# Patient Record
Sex: MALE | Race: WHITE | NOT HISPANIC OR LATINO | URBAN - METROPOLITAN AREA
[De-identification: names, ages, dates, MRNs, and addresses within clinical notes are randomized per-mention and may not be internally consistent; named-entity substitution may affect disease eponyms.]

---

## 2020-11-18 NOTE — PATIENT DISCUSSION
Sinusitis, Adult  Sinusitis is soreness and inflammation of your sinuses. Sinuses are hollow spaces in the bones around your face. Your sinuses are located:  · Around your eyes.  · In the middle of your forehead.  · Behind your nose.  · In your cheekbones.  Your sinuses and nasal passages are lined with a stringy fluid (mucus). Mucus normally drains out of your sinuses. When your nasal tissues become inflamed or swollen, the mucus can become trapped or blocked so air cannot flow through your sinuses. This allows bacteria, viruses, and funguses to grow, which leads to infection.  Sinusitis can develop quickly and last for 7-10 days (acute) or for more than 12 weeks (chronic). Sinusitis often develops after a cold.  CAUSES  This condition is caused by anything that creates swelling in the sinuses or stops mucus from draining, including:  · Allergies.  · Asthma.  · Bacterial or viral infection.  · Abnormally shaped bones between the nasal passages.  · Nasal growths that contain mucus (nasal polyps).  · Narrow sinus openings.  · Pollutants, such as chemicals or irritants in the air.  · A foreign object stuck in the nose.  · A fungal infection. This is rare.  RISK FACTORS  The following factors may make you more likely to develop this condition:  · Having allergies or asthma.  · Having had a recent cold or respiratory tract infection.  · Having structural deformities or blockages in your nose or sinuses.  · Having a weak immune system.  · Doing a lot of swimming or diving.  · Overusing nasal sprays.  · Smoking.  SYMPTOMS  The main symptoms of this condition are pain and a feeling of pressure around the affected sinuses. Other symptoms include:  · Upper toothache.  · Earache.  · Headache.  · Bad breath.  · Decreased sense of smell and taste.  · A cough that may get worse at night.  · Fatigue.  · Fever.  · Thick drainage from your nose. The drainage is often green and it may contain pus (purulent).  · Stuffy nose or  Surgery Counseling: I have discussed the option of scheduling surgery versus following, as well as the risks, benefits and alternatives of cataract surgery with the patient. It was explained that the surgery is medically indicated at this time, and it can be performed at the patient's option as delaying will cause no further deterioration, therefore there is no rush and there is no harm in waiting to have surgery. It was also explained that there is no guarantee that removing the cataract will improve their vision. The patient understands and desires to proceed with cataract surgery with the implantation of an intraocular lens to improve vision for ___WATCHING TV____. I have given the patient the prescribed regimen of the all-in-one drop to use before and after cataract surgery. They have elected to use the all-in-one option of Pred/Gati/Brom(prednisolone acetate,gatifloxacin,and bromfenac. Patient to administer as directed. congestion.  · Postnasal drip. This is when extra mucus collects in the throat or back of the nose.  · Swelling and warmth over the affected sinuses.  · Sore throat.  · Sensitivity to light.  DIAGNOSIS  This condition is diagnosed based on symptoms, a medical history, and a physical exam. To find out if your condition is acute or chronic, your health care provider may:  · Look in your nose for signs of nasal polyps.  · Tap over the affected sinus to check for signs of infection.  · View the inside of your sinuses using an imaging device that has a light attached (endoscope).  If your health care provider suspects that you have chronic sinusitis, you may also:  · Be tested for allergies.  · Have a sample of mucus taken from your nose (nasal culture) and checked for bacteria.  · Have a mucus sample examined to see if your sinusitis is related to an allergy.  If your sinusitis does not respond to treatment and it lasts longer than 8 weeks, you may have an MRI or CT scan to check your sinuses. These scans also help to determine how severe your infection is.  In rare cases, a bone biopsy may be done to rule out more serious types of fungal sinus disease.  TREATMENT  Treatment for sinusitis depends on the cause and whether your condition is chronic or acute. If a virus is causing your sinusitis, your symptoms will go away on their own within 10 days. You may be given medicines to relieve your symptoms, including:  · Topical nasal decongestants. They shrink swollen nasal passages and let mucus drain from your sinuses.  · Antihistamines. These drugs block inflammation that is triggered by allergies. This can help to ease swelling in your nose and sinuses.  · Topical nasal corticosteroids. These are nasal sprays that ease inflammation and swelling in your nose and sinuses.  · Nasal saline washes. These rinses can help to get rid of thick mucus in your nose.  If your condition is caused by bacteria, you will be given an  antibiotic medicine. If your condition is caused by a fungus, you will be given an antifungal medicine.  Surgery may be needed to correct underlying conditions, such as narrow nasal passages. Surgery may also be needed to remove polyps.  HOME CARE INSTRUCTIONS  Medicines  · Take, use, or apply over-the-counter and prescription medicines only as told by your health care provider. These may include nasal sprays.  · If you were prescribed an antibiotic medicine, take it as told by your health care provider. Do not stop taking the antibiotic even if you start to feel better.  Hydrate and Humidify  · Drink enough water to keep your urine clear or pale yellow. Staying hydrated will help to thin your mucus.  · Use a cool mist humidifier to keep the humidity level in your home above 50%.  · Inhale steam for 10-15 minutes, 3-4 times a day or as told by your health care provider. You can do this in the bathroom while a hot shower is running.  · Limit your exposure to cool or dry air.  Rest  · Rest as much as possible.  · Sleep with your head raised (elevated).  · Make sure to get enough sleep each night.  General Instructions  · Apply a warm, moist washcloth to your face 3-4 times a day or as told by your health care provider. This will help with discomfort.  · Wash your hands often with soap and water to reduce your exposure to viruses and other germs. If soap and water are not available, use hand .  · Do not smoke. Avoid being around people who are smoking (secondhand smoke).  · Keep all follow-up visits as told by your health care provider. This is important.  SEEK MEDICAL CARE IF:  · You have a fever.  · Your symptoms get worse.  · Your symptoms do not improve within 10 days.  SEEK IMMEDIATE MEDICAL CARE IF:  · You have a severe headache.  · You have persistent vomiting.  · You have pain or swelling around your face or eyes.  · You have vision problems.  · You develop confusion.  · Your neck is stiff.  · You have  trouble breathing.     This information is not intended to replace advice given to you by your health care provider. Make sure you discuss any questions you have with your health care provider.     Document Released: 12/18/2006 Document Revised: 04/10/2017 Document Reviewed: 10/12/2016  Elsevier Interactive Patient Education ©2017 [employer]

## 2020-11-18 NOTE — PATIENT DISCUSSION
CATARACTS, OU - VISUALLY SIGNIFICANT. SCHEDULE _OS_ FIRST THEN LATER IN _OD_ DISCUSSED OPTION OF ___STANDARD OU___. PATIENT UNDERSTANDS AND DESIRES ____STANDARD OU____.

## 2021-01-04 NOTE — PATIENT DISCUSSION
S/P PE IOL, _OS_. DOING WELL. CONTINUE PRED-GATI-BROM IN THE SURGICAL EYE  FOR A TOTAL OF 3 WEEKS USE THEN DISCONTINUE. PATIENT DESIRES _STANDARD__IOL FOR 2ND EYE. SCHEDULE CATARACT SURGERY.

## 2021-01-04 NOTE — PATIENT DISCUSSION
Pre-Op 2nd Eye Counseling: The patient has noticed an improvement in their visual symptoms in the operative eye. The patient complains of decreased vision in the fellow eye when _DRIVING / READING_. It was explained to the patient that the decision to proceed with cataract surgery in the fellow eye is entirely a separate decision from the surgical eye. All of the same risks, benefits and alternatives are reviewed with the patient again. The patient does feel the vision in the non-operative eye is limiting their daily activities and elects to proceed with cataract surgery in the _RIGHT_ eye. . I have given the patient the prescribed regimen of  drops to use before and after cataract surgery. Patient to administer as directed.

## 2021-01-20 NOTE — PATIENT DISCUSSION
S/P PE IOL, OU, DOING WELL. CONTINUE DROPS AS DIRECTED. SPECS RX OFFERED. RX ARC IN SPECS TO MINIMIZE GLARE. RETURN FOR FOLLOW-UP AS SCHEDULED.

## 2021-02-12 NOTE — PATIENT DISCUSSION
Continue: erythromycin (erythromycin): ointment: 5 mg/gram (0.5 %) a small amount at bedtime into affected eye 02-

## 2021-05-12 NOTE — PATIENT DISCUSSION
New Prescription: FreshKote (polyvinyl alcohol-povidone): drops: 2.7-2% 1 drop four times a day into both eyes 05-

## 2021-08-05 NOTE — PATIENT DISCUSSION
The patient was informed that with [facility] for distance, they will need glasses for all near and intermediate activities after surgery. The patient understands there is a possibility they may need an enhancement after surgery. The patient elects [employer] OD, goal of emmetropia.

## 2021-08-10 NOTE — PATIENT DISCUSSION
The patient was informed that with [address] [facility] for distance, they will need glasses for all near and intermediate activities after surgery. The patient understands there is a possibility they may need an enhancement after surgery. The patient elects [employer] OD, goal of emmetropia.

## 2021-08-10 NOTE — PATIENT DISCUSSION
The patient was informed that with [address] [facility] for distance, they will need glasses for all near and intermediate activities after surgery. The patient understands there is a possibility they may need an enhancement after surgery. The patient elects [employer] OD, goal of emmetropia.

## 2021-08-11 NOTE — PATIENT DISCUSSION
Cataract surgery has been performed in the first eye and activities of daily living are still impaired. The patient would like to proceed with cataract surgery in the second eye as scheduled. The patient elects [employer] OS, goal of Mera.

## 2021-09-10 NOTE — PATIENT DISCUSSION
1 day PO: Patient is doing well post-operatively. The importance of post-op drop compliance was emphasized. Drop schedule reviewed with patient. Patient to call if any visual changes or concerns.
Continue drops as directed.
Darrell Ayala 72 OCT is normal ou.
Despite some risk factors, the patient does not demonstrate definitive evidence of glaucoma at this time.
Discussed condition and exacerbating conditions/situations (e.g., dry/arid environments, overhead fans, air conditioners, side effect of medications).
Discussed lid hygiene, warm compress and eyelid wash.
Discussed the importance of blood sugar control in the prevention of ocular complications.
LensAR.
Monitor.
No retinal holes, tears, or detachment at this time.
[facility]
Patient made aware of 24/7 emergency services.
Patient understands condition, prognosis and need for follow up care.
Patient understands there is an increased risk of corneal edema after cataract surgery.
RD/tear warning symptoms reviewed. F&F brochure given. Call immediately if increase in floaters, flashes, veil, blur.
Reassured patient.
Recommended artificial tears to use: 1 drop 4x a day in both eyes.
Recommended observation.
Recommended yearly dilated eye examinations.
Stable.
10

## 2022-05-31 ENCOUNTER — NEW PATIENT (OUTPATIENT)
Dept: URBAN - METROPOLITAN AREA CLINIC 6 | Facility: CLINIC | Age: 57
End: 2022-05-31

## 2022-05-31 DIAGNOSIS — H25.13: ICD-10-CM

## 2022-05-31 PROCEDURE — 92004 COMPRE OPH EXAM NEW PT 1/>: CPT

## 2022-05-31 ASSESSMENT — VISUAL ACUITY
OU_CC: J1
OU_SC: J5
OD_SC: 20/20-3
OS_SC: 20/20-3

## 2022-05-31 ASSESSMENT — TONOMETRY
OD_IOP_MMHG: 13
OS_IOP_MMHG: 14

## 2022-05-31 NOTE — PATIENT DISCUSSION
Discussed option of glasses vs monovision LASIK. Patient would prefer LASIK due to his occupation as a .

## 2022-05-31 NOTE — PATIENT DISCUSSION
Interested in [facility] surgery ([facility]), will send referral to Dr Cristóbal Hammer.

## 2022-07-11 ENCOUNTER — CONSULTATION/EVALUATION (OUTPATIENT)
Dept: URBAN - METROPOLITAN AREA CLINIC 14 | Facility: CLINIC | Age: 57
End: 2022-07-11

## 2022-07-11 DIAGNOSIS — H52.4: ICD-10-CM

## 2022-07-11 DIAGNOSIS — H25.13: ICD-10-CM

## 2022-07-11 PROCEDURE — 99499LK REFRACTIVE CONSULT/LASIK

## 2022-07-11 ASSESSMENT — KERATOMETRY
OD_AXISANGLE2_DEGREES: 86
OD_AXISANGLE_DEGREES: 176
OS_AXISANGLE_DEGREES: 0
OS_K1POWER_DIOPTERS: 43.75
OD_K2POWER_DIOPTERS: 44.00
OD_K1POWER_DIOPTERS: 43.50
OS_K2POWER_DIOPTERS: 43.75
OS_AXISANGLE2_DEGREES: 90

## 2022-07-11 ASSESSMENT — VISUAL ACUITY
OS_SC: J6
OS_SC: 20/20
OU_SC: 20/20
OU_SC: J6
OD_SC: J7
OD_SC: 20/20

## 2022-07-11 ASSESSMENT — TONOMETRY
OD_IOP_MMHG: 15
OS_IOP_MMHG: 19

## 2022-07-11 ASSESSMENT — PACHYMETRY: OS_CT_UM: 546

## 2022-07-11 NOTE — PATIENT DISCUSSION
Patient understands that vision after refractive procedure may be short lived due to cataracts. He understands and desires to proceed with monovision contact lens trial prior to refractive procedure.

## 2022-07-11 NOTE — PATIENT DISCUSSION
Discussed findings, not visually significant at this time. Advised patient that as cataracts progress vision will continue to deteriorate even after refractive surgery.

## 2022-07-11 NOTE — PATIENT DISCUSSION
Refractive Counseling: Good candidate for LASIK. I have discussed the options for refractive surgery to decrease dependency on glasses and/or contact lenses. These options include LASIK, PRK / ASA, ICL, RLE. It was emphasized to the patient that the goal of reducing spectacle dependence not spectacle freedom is more realistic. The patient understands it is possible they will still need a weak spectacle prescription and/or a LASIK enhancement to achieve visual target.

## 2022-08-18 ENCOUNTER — CLINIC PROCEDURE ONLY (OUTPATIENT)
Dept: URBAN - METROPOLITAN AREA CLINIC 14 | Facility: CLINIC | Age: 57
End: 2022-08-18

## 2022-08-18 DIAGNOSIS — H52.7: ICD-10-CM

## 2022-08-18 PROBLEM — Z98.890: Noted: 2022-08-18

## 2022-08-18 PROCEDURE — 99211NC NO CHARGE VISIT

## 2022-08-18 RX ORDER — GATIFLOXACIN 5 MG/ML: 1 SOLUTION/ DROPS OPHTHALMIC

## 2022-08-18 RX ORDER — PREDNISOLONE ACETATE 10 MG/ML: 1 SUSPENSION/ DROPS OPHTHALMIC

## 2022-08-18 ASSESSMENT — VISUAL ACUITY: OS_CC: J1+

## 2022-08-18 NOTE — PROCEDURE NOTE: CLINICAL
PROCEDURE NOTE: LASIK OS. Anesthesia: Oral Sedative. Prep: Antibiotic Drops. Betadine. After discussing risks, benefits and alternatives, patient has elected to proceed with the procedure and an informed consent was obtained. Prior to commencing surgery, patient identification, surgical procedure, site, and side with a time out were confirmed by the physician. Several drops of topical anesthetic and one drop of povidone iodine were instilled, and the eye was flushed with balance salt solution. A non-aspirating lid speculum was placed, and the patient was positioned under the femtosecond laser. A sterile patient interface was placed on the eye. After pressure was confirmed, and suction was achieved, the eye and flap procedure were centered and laser was applied, and LASIK flap created. The suction was discontinued, the patient interface was discarded, and the lid speculum removed. The patient was then positioned under the excimer laser. Prior to the treatment, patient identification, surgical procedure, site, and side with a time out were confirmed by the physician. A drop of topical anesthetic instilled. The patient was draped using a clean technique to isolate and cover the lashes and lid margins. An aspirating lid speculum was placed. A drop of balance salt solution was instilled. The edge of the LASIK flap was located and lifted with the Patricia I IntraLase flap . The corneal bed dried with a Merocel eye sponge, the pupil was centered, and laser treatment was applied. An additional drop of balance salt solution applied to corneal bed; flap was repositioned/floated into position. Excess Balance Salt Solution was removed with a Pretty ring. The corneal flap was smoothed with wet and dry Merocel. A drop of topical antibiotic and steroid was instilled consecutively. The lid speculum removed. The patient was escorted to recovery area in stable condition and without complication. Post procedure instructions given. Myke Morataya

## 2022-08-19 ENCOUNTER — POST-OP (OUTPATIENT)
Dept: URBAN - METROPOLITAN AREA CLINIC 14 | Facility: CLINIC | Age: 57
End: 2022-08-19

## 2022-08-19 DIAGNOSIS — H52.4: ICD-10-CM

## 2022-08-19 PROCEDURE — 99024LK POST OP LASIK CARE ONLY

## 2022-08-19 ASSESSMENT — KERATOMETRY
OD_AXISANGLE_DEGREES: 176
OS_K2POWER_DIOPTERS: 43.75
OD_AXISANGLE2_DEGREES: 86
OS_AXISANGLE_DEGREES: 0
OD_K2POWER_DIOPTERS: 44.00
OS_K1POWER_DIOPTERS: 43.75
OD_K1POWER_DIOPTERS: 43.50
OS_AXISANGLE2_DEGREES: 90

## 2022-08-19 ASSESSMENT — VISUAL ACUITY: OS_SC: J1+

## 2022-08-30 ENCOUNTER — POST-OP (OUTPATIENT)
Dept: URBAN - METROPOLITAN AREA CLINIC 14 | Facility: CLINIC | Age: 57
End: 2022-08-30

## 2022-08-30 DIAGNOSIS — Z98.890: ICD-10-CM

## 2022-08-30 PROCEDURE — 99024LK POST OP LASIK CARE ONLY

## 2022-08-30 ASSESSMENT — VISUAL ACUITY
OS_SC: 20/400
OU_SC: 20/25
OS_SC: J1+
OD_SC: 20/25
OU_SC: J1+

## 2022-08-30 ASSESSMENT — KERATOMETRY
OS_K1POWER_DIOPTERS: 43.75
OD_AXISANGLE_DEGREES: 176
OD_K2POWER_DIOPTERS: 44.00
OD_K1POWER_DIOPTERS: 43.50
OS_AXISANGLE_DEGREES: 0
OS_AXISANGLE2_DEGREES: 90
OS_K2POWER_DIOPTERS: 43.75
OD_AXISANGLE2_DEGREES: 86

## 2022-09-27 ENCOUNTER — POST-OP (OUTPATIENT)
Dept: URBAN - METROPOLITAN AREA CLINIC 14 | Facility: CLINIC | Age: 57
End: 2022-09-27

## 2022-09-27 DIAGNOSIS — Z98.890: ICD-10-CM

## 2022-09-27 PROCEDURE — 99024LK POST OP LASIK CARE ONLY

## 2022-09-27 ASSESSMENT — KERATOMETRY
OS_K1POWER_DIOPTERS: 43.75
OS_AXISANGLE2_DEGREES: 90
OD_AXISANGLE_DEGREES: 176
OD_K2POWER_DIOPTERS: 44.00
OS_AXISANGLE_DEGREES: 0
OD_K1POWER_DIOPTERS: 43.50
OS_K2POWER_DIOPTERS: 43.75
OD_AXISANGLE2_DEGREES: 86

## 2022-09-27 ASSESSMENT — VISUAL ACUITY
OD_SC: 20/25
OS_BCVA: 20/20
OD_BCVA: 20/20
OS_SC: J1+
OS_SC: 20/200

## 2022-11-21 ENCOUNTER — POST-OP (OUTPATIENT)
Dept: URBAN - METROPOLITAN AREA CLINIC 14 | Facility: CLINIC | Age: 57
End: 2022-11-21

## 2022-11-21 DIAGNOSIS — Z98.890: ICD-10-CM

## 2022-11-21 PROCEDURE — 99024LK POST OP LASIK CARE ONLY

## 2022-11-21 ASSESSMENT — KERATOMETRY
OS_K2POWER_DIOPTERS: 43.75
OS_AXISANGLE2_DEGREES: 90
OD_AXISANGLE2_DEGREES: 86
OS_AXISANGLE_DEGREES: 0
OD_K1POWER_DIOPTERS: 43.50
OD_AXISANGLE_DEGREES: 176
OS_K1POWER_DIOPTERS: 43.75
OD_K2POWER_DIOPTERS: 44.00

## 2022-11-21 ASSESSMENT — VISUAL ACUITY
OD_SC: 20/25
OS_SC: J1+
OS_BCVA: 20/20
OU_SC: 20/15
OS_SC: 20/200

## 2023-08-04 ENCOUNTER — ESTABLISHED PATIENT (OUTPATIENT)
Dept: URBAN - METROPOLITAN AREA CLINIC 14 | Facility: CLINIC | Age: 58
End: 2023-08-04

## 2023-08-04 DIAGNOSIS — H25.13: ICD-10-CM

## 2023-08-04 PROCEDURE — 92014 COMPRE OPH EXAM EST PT 1/>: CPT

## 2023-08-04 ASSESSMENT — KERATOMETRY
OS_K2POWER_DIOPTERS: 45.00
OS_AXISANGLE2_DEGREES: 90
OD_AXISANGLE_DEGREES: 170
OS_AXISANGLE_DEGREES: 0
OS_K1POWER_DIOPTERS: 45.00
OD_K2POWER_DIOPTERS: 44.00
OD_AXISANGLE2_DEGREES: 80
OD_K1POWER_DIOPTERS: 43.50

## 2023-08-04 ASSESSMENT — VISUAL ACUITY
OS_SC: 20/25
OS_SC: J3
OD_SC: 20/15

## 2023-08-04 ASSESSMENT — TONOMETRY
OS_IOP_MMHG: 14
OD_IOP_MMHG: 13

## 2023-10-24 NOTE — PATIENT DISCUSSION
COUNSELING: Recommendations (Free Text): Referral to [facility] provided today Detail Level: Zone Recommendation Preamble: The following recommendations were made during the visit: Render Risk Assessment In Note?: no

## 2025-01-07 ENCOUNTER — COMPREHENSIVE EXAM (OUTPATIENT)
Age: 60
End: 2025-01-07

## 2025-01-07 DIAGNOSIS — H25.13: ICD-10-CM

## 2025-01-07 DIAGNOSIS — H52.4: ICD-10-CM

## 2025-01-07 PROCEDURE — 92014 COMPRE OPH EXAM EST PT 1/>: CPT

## 2025-01-07 PROCEDURE — 92015 DETERMINE REFRACTIVE STATE: CPT
